# Patient Record
Sex: FEMALE | Race: WHITE | URBAN - METROPOLITAN AREA
[De-identification: names, ages, dates, MRNs, and addresses within clinical notes are randomized per-mention and may not be internally consistent; named-entity substitution may affect disease eponyms.]

---

## 2025-01-30 ENCOUNTER — OFFICE VISIT (OUTPATIENT)
Dept: URGENT CARE | Age: 45
End: 2025-01-30
Payer: COMMERCIAL

## 2025-01-30 VITALS
SYSTOLIC BLOOD PRESSURE: 136 MMHG | DIASTOLIC BLOOD PRESSURE: 83 MMHG | OXYGEN SATURATION: 99 % | TEMPERATURE: 98.2 F | RESPIRATION RATE: 17 BRPM | HEART RATE: 82 BPM | WEIGHT: 110 LBS

## 2025-01-30 DIAGNOSIS — J01.10 ACUTE NON-RECURRENT FRONTAL SINUSITIS: Primary | ICD-10-CM

## 2025-01-30 RX ORDER — AMOXICILLIN 875 MG/1
875 TABLET, FILM COATED ORAL 2 TIMES DAILY
Qty: 14 TABLET | Refills: 0 | Status: SHIPPED | OUTPATIENT
Start: 2025-01-30 | End: 2025-02-06

## 2025-01-30 ASSESSMENT — ENCOUNTER SYMPTOMS: SINUS COMPLAINT: 1

## 2025-01-30 NOTE — PATIENT INSTRUCTIONS
Avoid sick individuals when possible  Cough or sneeze into sleeve, rather than hands  Mask while in public  Frequent handwashing   Rest and increase clear liquid intake  Blow nose, rather than sniffing  Sleep with head of bed elevated  Use humidifier  Lozenges for throat discomfort  Acetaminophen or ibuprofen for pain/fever  Take antibiotics as prescribed  Eat before taking antibiotic to minimize stomach discomfort  Return for any new or worsening symptoms or if you fail to improve after 3-5 days

## 2025-01-30 NOTE — PROGRESS NOTES
Subjective   Patient ID: Liudmila Lacy is a 44 y.o. female. They present today with a chief complaint of Sinus Problem (Sinus congestion, headache and sinus pressure on and off one month. ).    History of Present Illness  Subjective  Liudmila Lacy is a 44 y.o. female who presents for evaluation of possible sinus infection. Symptoms include congestion, headache described as frontal, nasal congestion, no  fever, post nasal drip, and sinus pressure with no fever, chills, night sweats or weight loss. Onset of symptoms was 1 month ago, gradually worsening since that time. She is drinking plenty of fluids. Past history is significant for viral URI. Patient is a non-smoker.    Review of Systems   Constitutional:  Endorses malaise, fatigue.  Denies fever, chills.  ENT: Denies sore throat, otalgia, rhinorrhea  Respiratory:  Denies cough, sputum production, shortness of breath, wheezing.    Cardiovascular:  Denies chest pain, palpitations, syncope, lightheadedness, dizziness.    Gastrointestinal:  Denies abdominal pain, nausea, vomiting, diarrhea.    Integumentary:  Denies rash.    All other systems are negative        History provided by:  Patient   used: No    Sinus Problem      Past Medical History  Allergies as of 01/30/2025    (No Known Allergies)       (Not in a hospital admission)       No past medical history on file.    No past surgical history on file.         Review of Systems  Review of Systems                               Objective    Vitals:    01/30/25 1251 01/30/25 1318   BP: 132/84 136/83   BP Location:  Right arm   Patient Position:  Sitting   BP Cuff Size:  Adult   Pulse: 82    Resp: 17    Temp: 36.8 °C (98.2 °F)    SpO2: 99%    Weight: 49.9 kg (110 lb)      No LMP recorded.    Physical Exam  Vitals and nursing note reviewed.   Constitutional:       General: She is not in acute distress.     Appearance: Normal appearance. She is normal weight. She is ill-appearing. She is not  toxic-appearing or diaphoretic.   HENT:      Right Ear: Tympanic membrane, ear canal and external ear normal. There is no impacted cerumen.      Left Ear: Tympanic membrane, ear canal and external ear normal. There is no impacted cerumen.      Nose: Congestion and rhinorrhea present.      Right Turbinates: Enlarged and swollen.      Left Turbinates: Enlarged and swollen.      Mouth/Throat:      Mouth: Mucous membranes are moist.      Pharynx: Oropharynx is clear. Postnasal drip present. No pharyngeal swelling, oropharyngeal exudate, posterior oropharyngeal erythema or uvula swelling.      Tonsils: No tonsillar exudate or tonsillar abscesses.   Eyes:      General: No scleral icterus.        Right eye: No discharge.         Left eye: No discharge.      Conjunctiva/sclera: Conjunctivae normal.   Cardiovascular:      Rate and Rhythm: Normal rate and regular rhythm.      Pulses: Normal pulses.      Heart sounds: Normal heart sounds.   Pulmonary:      Effort: Pulmonary effort is normal. No respiratory distress.      Breath sounds: Normal breath sounds. No stridor. No wheezing, rhonchi or rales.   Musculoskeletal:         General: Normal range of motion.      Cervical back: Normal range of motion and neck supple. No rigidity or tenderness.   Lymphadenopathy:      Cervical: No cervical adenopathy.   Skin:     General: Skin is warm and dry.      Coloration: Skin is not jaundiced or pale.      Findings: No bruising, erythema or rash.   Neurological:      General: No focal deficit present.      Mental Status: She is alert and oriented to person, place, and time.      Sensory: No sensory deficit.      Motor: No weakness.      Coordination: Coordination normal.      Gait: Gait normal.         Procedures    Point of Care Test & Imaging Results from this visit  No results found for this visit on 01/30/25.   No results found.    Diagnostic study results (if any) were reviewed by YADY Mendosa-CNP.    Assessment/Plan    Allergies, medications, history, and pertinent labs/EKGs/Imaging reviewed by GODFREY Mendosa.     Medical Decision Making    ?Based on history of persistent sinus symptoms, likely bacterial sinusitis.?No evidence of Meningitis, OM, Strep or Pneumonia.? Will start on oral antibiotics today.? Encouraged patient to continue symptomatic and supportive care measures.? RTC with any new or worsening symptoms.? Discussed when to seek emergent care. Patient verbalized understanding and agreeable with plan.??         Orders and Diagnoses  Diagnoses and all orders for this visit:  Acute non-recurrent frontal sinusitis  -     amoxicillin (Amoxil) 875 mg tablet; Take 1 tablet (875 mg) by mouth 2 times a day for 7 days.      Medical Admin Record      Patient disposition: Home    Electronically signed by GODFREY Mendosa  1:33 PM

## 2025-07-23 DIAGNOSIS — Z12.31 ENCOUNTER FOR SCREENING MAMMOGRAM FOR BREAST CANCER: ICD-10-CM

## 2025-09-30 ENCOUNTER — APPOINTMENT (OUTPATIENT)
Dept: PRIMARY CARE | Facility: CLINIC | Age: 45
End: 2025-09-30
Payer: COMMERCIAL